# Patient Record
Sex: MALE | Race: BLACK OR AFRICAN AMERICAN
[De-identification: names, ages, dates, MRNs, and addresses within clinical notes are randomized per-mention and may not be internally consistent; named-entity substitution may affect disease eponyms.]

---

## 2020-05-08 ENCOUNTER — HOSPITAL ENCOUNTER (EMERGENCY)
Dept: HOSPITAL 61 - ER | Age: 35
Discharge: HOME | End: 2020-05-08
Payer: SELF-PAY

## 2020-05-08 VITALS — WEIGHT: 200.4 LBS | BODY MASS INDEX: 25.72 KG/M2 | HEIGHT: 74 IN

## 2020-05-08 VITALS — DIASTOLIC BLOOD PRESSURE: 81 MMHG | SYSTOLIC BLOOD PRESSURE: 141 MMHG

## 2020-05-08 DIAGNOSIS — G51.0: Primary | ICD-10-CM

## 2020-05-08 DIAGNOSIS — F17.200: ICD-10-CM

## 2020-05-08 DIAGNOSIS — J45.909: ICD-10-CM

## 2020-05-08 PROCEDURE — 99283 EMERGENCY DEPT VISIT LOW MDM: CPT

## 2020-05-08 NOTE — PHYS DOC
Past Medical History


Past Medical History:  Asthma


Past Surgical History:  No Surgical History


Smoking Status:  Current Every Day Smoker


Additional Information:  


0.5 PPD


Alcohol Use:  Occasionally





General Adult


EDM:


Chief Complaint:  FACE PROBLEM





HPI:


HPI:





Patient is a 34-year-old relatively healthy male who presents with a 2-day 

history of some right-sided facial weakness.  He states he is noticed that his 

smile is asymmetric he is having a difficult time closing his right eye.  Does 

have some pain associated with it but nothing significant.  He denies a headache

or any other lateralizing neurologic weakness.  He is never had Bell's palsy 

before.  []





Review of Systems:


Review of Systems:


Constitutional:  Denies fever or chills. []


Eyes:  Denies change in visual acuity. []


HENT:  Denies nasal congestion or sore throat. [] 


Respiratory:  Denies cough or shortness of breath. [] 


Cardiovascular:  Denies chest pain or edema. [] 


GI:  Denies abdominal pain, nausea, vomiting, bloody stools or diarrhea. [] 


:  Denies dysuria. [] 


Musculoskeletal:  Denies back pain or joint pain. [] 


Integument:  Denies rash. [] 


Neurologic: Per HPI [] 


Endocrine:  Denies polyuria or polydipsia. [] 


Lymphatic:  Denies swollen glands. [] 


Psychiatric:  Denies depression or anxiety. []





Heart Score:


Risk Factors:


Risk Factors:  DM, Current or recent (<one month) smoker, HTN, HLP, family 

history of CAD, obesity.


Risk Scores:


Score 0 - 3:  2.5% MACE over next 6 weeks - Discharge Home


Score 4 - 6:  20.3% MACE over next 6 weeks - Admit for Clinical Observation


Score 7 - 10:  72.7% MACE over next 6 weeks - Early Invasive Strategies





Allergies:


Allergies:





Allergies








Coded Allergies Type Severity Reaction Last Updated Verified


 


  No Known Drug Allergies    5/8/20 No











Physical Exam:


PE:





Constitutional: Well developed, well nourished, no acute distress, non-toxic 

appearance. []


HENT: Normocephalic, atraumatic, bilateral external ears normal, oropharynx 

moist, no oral exudates, nose normal. []


Eyes: PERRLA, EOMI, conjunctiva normal, no discharge. [] 


Neck: Normal range of motion, no tenderness, supple, no stridor. [] 


Cardiovascular:Heart rate regular rhythm, no murmur []


Lungs & Thorax:  Bilateral breath sounds clear to auscultation []


Abdomen: Bowel sounds normal, soft, no tenderness, no masses, no pulsatile 

masses. [] 


Skin: Warm, dry, no erythema, no rash. [] 


Back: No tenderness, no CVA tenderness. [] 


Extremities: No tenderness, no cyanosis, no clubbing, ROM intact, no edema. [] 


Neurologic: Right-sided facial weakness with forehead involvement consistent 

with Bell's palsy []


Psychologic: Anxious. []





Current Patient Data:


Vital Signs:





                                   Vital Signs








  Date Time  Temp Pulse Resp B/P (MAP) Pulse Ox O2 Delivery O2 Flow Rate FiO2


 


5/8/20 09:34 98.2 69 17 181/100 (127) 98 Room Air  





 98.2       











EKG:


EKG:


[]





Radiology/Procedures:


Radiology/Procedures:


[]





Course & Med Decision Making:


Course & Med Decision Making


Pertinent Labs and Imaging studies reviewed. (See chart for details)





[]





Dragon Disclaimer:


Dragon Disclaimer:


This electronic medical record was generated, in whole or in part, using a voice

 recognition dictation system.





Departure


Departure


Impression:  


   Primary Impression:  


   Bell's palsy


Disposition:  01 HOME, SELF-CARE


Condition:  STABLE


Referrals:  


NO PCP (PCP)


Patient Instructions:  Bell's Palsy





Additional Instructions:  


You need to follow-up with a primary care physician to keep a close eye on your 

blood pressure.  It is very important that you take all the medicines exactly as

 prescribed.  Return to the emergency department with any new or concerning 

symptoms


Scripts


Prednisone (PREDNISONE) 20 Mg Tablet


3 TAB PO DAILY PRN for COUGH for 7 Days, #21 TAB


   Prov: BEATRICE ROBERT DO         5/8/20 


Valacyclovir Hcl (VALTREX) 1,000 Mg Tablet


1 TAB PO TID, #21 TAB


   Prov: BEATRICE ROBERT DO         5/8/20











BEATRICE ROBERT DO              May 8, 2020 10:05

## 2020-07-11 ENCOUNTER — HOSPITAL ENCOUNTER (EMERGENCY)
Dept: HOSPITAL 61 - ER | Age: 35
End: 2020-07-11
Payer: SELF-PAY

## 2020-07-11 VITALS — SYSTOLIC BLOOD PRESSURE: 129 MMHG | DIASTOLIC BLOOD PRESSURE: 58 MMHG

## 2020-07-11 VITALS — WEIGHT: 212.08 LBS | HEIGHT: 74 IN | BODY MASS INDEX: 27.22 KG/M2
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
## 2020-07-11 DIAGNOSIS — S80.812A: Primary | ICD-10-CM
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
## 2020-07-11 DIAGNOSIS — J45.909: ICD-10-CM
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
## 2020-07-11 DIAGNOSIS — F17.200: ICD-10-CM
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
## 2020-07-11 PROCEDURE — 90715 TDAP VACCINE 7 YRS/> IM: CPT

## 2020-07-11 PROCEDURE — 90471 IMMUNIZATION ADMIN: CPT

## 2020-07-11 PROCEDURE — 96372 THER/PROPH/DIAG INJ SC/IM: CPT

## 2020-07-11 PROCEDURE — 99284 EMERGENCY DEPT VISIT MOD MDM: CPT

## 2020-07-11 NOTE — PHYS DOC
Past Medical History


Past Medical History:  Asthma


 (MUSHTAQ CARBAJAL)


Past Surgical History:  No Surgical History


 (MUSHTAQ CARBAJAL)


Smoking Status:  Current Every Day Smoker


Alcohol Use:  Occasionally


 (MUSHTAQ CARBAJAL)





General Adult


EDM:


Chief Complaint:  LOWER EXT PAIN





HPI:


HPI:





Patient is a 35  year old male who presents with complaints of painful abrasion 

to his left lower leg that he suffered 3 days ago.  Patient states that a piece 

of metal scraped down the back of his lower leg.  Initially it was only slightly

painful, but now his pain scale is about a 4/10.  Patient states that he has 

cleansed it with soap and water daily and has been placing peroxide on for wound

care.  Patient states his tetanus shot was greater than 5 years ago.  Patient 

states that he would like a work excuse and a prescription for pain medicines.  

Patient denies fever chills, any changes in visual acuity, denies nasal 

congestion or sore throat.  Patient denies any cough or shortness of breath.  

Patient denies chest pain or extremity edema.  Patient denies abdominal pain, 

nausea, vomiting, diarrhea, or bloody stools.  Patient denies any back pain or 

pain in his joints.  Patient denies any skin rashes.  Patient denies problems 

urinating, increased urination, increased thirst.  Patient denies any swollen 

glands.  Patient denies any headaches focal weaknesses or sensory changes since 

the incident.  Patient denies any depression, anxiety, or recent life changes.


 (MUSHTAQ CARBAJAL)





Review of Systems:


Review of Systems:


Constitutional:   Denies fever or chills. 


Eyes:   Denies change in visual acuity. 


HENT:   Denies nasal congestion or sore throat.  


Respiratory:   Denies cough or shortness of breath.  


Cardiovascular:   Denies chest pain or edema.  


GI:   Denies abdominal pain, nausea, vomiting, bloody stools or diarrhea.  


:  Denies dysuria.  


Musculoskeletal:   Denies back pain or joint pain.  


Integument:   Denies rash.  Complains of abrasion to his left lower leg


Neurologic:   Denies headache, focal weakness or sensory changes.  


Endocrine:   Denies polyuria or polydipsia.  


Lymphatic:  Denies swollen glands.  


Psychiatric:  Denies depression or anxiety. 


 (MUSHTAQ CARBAJAL)





Heart Score:


Risk Factors:


Risk Factors:  DM, Current or recent (<one month) smoker, HTN, HLP, family 

history of CAD, obesity.


Risk Scores:


Score 0 - 3:  2.5% MACE over next 6 weeks - Discharge Home


Score 4 - 6:  20.3% MACE over next 6 weeks - Admit for Clinical Observation


Score 7 - 10:  72.7% MACE over next 6 weeks - Early Invasive Strategies


 (MUSHTAQ CARBAJAL)





Family History:


Family History:


No family history significant to this ER visit today.


 (MUSHTAQ CARBAJAL)





Current Medications:


Patient denies taking home medications.


Current Medications








 Medications


  (Trade)  Dose


 Ordered  Sig/Rubia  Start Time


 Stop Time Status Last Admin


Dose Admin


 


 Bacitracin


  (Bacitracin Zinc


 Oint Pkt)  1 pkt  1X  ONCE  7/11/20 18:15


 7/11/20 18:16   





 


 Diphtheria/


 Tetanus/Acell


 Pertussis


  (ADACEL TDap


 SYRINGE)  0.5 ml  ONCE ONCE  7/11/20 18:15


 7/11/20 18:16   





 


 Ketorolac


 Tromethamine


  (Toradol Im)  60 mg  1X  ONCE  7/11/20 18:15


 7/11/20 18:16   











 (MUSHTAQ CARBAJAL)





Allergies:


Allergies:


Patient denies allergies to medications.


Allergies








Coded Allergies Type Severity Reaction Last Updated Verified


 


  No Known Drug Allergies    5/8/20 No








 (MUSHTAQ CARBAJAL)





Physical Exam:


PE:





Constitutional: Well developed, well nourished, no acute distress, non-toxic 

appearance. 


HENT: Normocephalic, atraumatic, bilateral external ears normal, oropharynx 

moist, no oral exudates, nose normal. 


Eyes: PERRLA, EOMI, conjunctiva normal, no discharge.  Pupils 5 mm.


Neck: Normal range of motion, no tenderness, supple, no stridor.  


Cardiovascular:Heart rate regular rhythm, no murmur heart sounds S1-S2, no 

abnormalities noted per auscultation.


Lungs & Thorax:  Bilateral breath sounds clear to auscultation all lung fields.


Abdomen: Bowel sounds normal all 4 quadrants to auscultation, soft, no 

tenderness, no masses, no pulsatile masses.  


Skin: Warm, dry, no erythema, no rash.   Has superficial abrasion to his left 

lower extremity from just below the knee down to the Achilles area posterior 

skin surface, without bleeding, consistent with abrasion from 3 days ago, no 

sign of infectious process.  No drainage.  Abrasion is slightly erythematous.


Back: No tenderness, no CVA tenderness.  


Extremities: Tenderness to left lower extremity, otherwise all other extremities

are nontender, no cyanosis, no clubbing, ROM intact, no edema.  


Neurologic: Alert and oriented X 3, normal motor function, normal sensory 

function, no focal deficits noted. 


Psychologic: Affect normal, judgement normal, mood normal. 


 (MUSHTAQ CARBAJAL)





Current Patient Data:


Vital Signs:





                                   Vital Signs








  Date Time  Temp Pulse Resp B/P (MAP) Pulse Ox O2 Delivery O2 Flow Rate FiO2


 


7/11/20 17:19 98.3 73 24  98   





 98.3       








 (MUSHTAQ CARBAJAL)





EKG:


EKG:


[]


 (MUSHTAQ CARBAJAL)





Radiology/Procedures:


Radiology/Procedures:


[]


 (MUSHTAQ CARBAJAL)





Course & Med Decision Making:


Course & Med Decision Making


Pertinent Labs and Imaging studies reviewed. (See chart for details)





35-year-old male patient presents to the emergency department with complaints of

 an abrasion to his left lower extremity that he suffered 3 days ago.  Patient 

states that a piece of metal scraped the back of his leg while he was outside 

working.  Patient states he has been cleaning with soap and water and peroxide. 

 Patient complains of increased pain over the past 3 days.  Patient is 

requesting pain medication and a work excuse.  Upon examination of patient's 

abrasion, patient has a noninfectious abrasion that that runs the length of his 

left lower extremity from just above the calf down to the Achilles tendon 

posterior aspect, nonbleeding, no purulent drainage noted, slightly 

erythematous.  Patient rates his pain at a 4/10.  Plan is to update patient's 

tetanus status as it is been longer than 5 years since his last tetanus 

immunization.  We will also cleanse wound and dressed with bacitracin ointment. 

 We will give 60 mg Toradol IM injection for pain.  Will give work excuse.  

Discussed discharge instructions and wound care instructions with patient.  

Patient is agreeable to home care instructions and DC plan.  Patient will be 

given a prescription of 800 mg Motrin for pain.  Patient gave verbal 

understanding of home instructions, has no further questions or concerns.


 (MUSHTAQ CARBAJAL)





Dragon Disclaimer:


Dragon Disclaimer:


This electronic medical record was generated, in whole or in part, using a voice

 recognition dictation system.


 (MUSHTAQ CARBAJAL)





Departure


Departure


Impression:  


   Primary Impression:  


   Abrasion of left leg


Disposition:  01 HOME, SELF-CARE


Condition:  GOOD


Referrals:  


NO PCP (PCP)


Patient Instructions:  Wound Care, Easy-to-Read





Additional Instructions:  


Take prescribed medications as directed.  Follow wound care instructions.  

Return to the emergency department for further concerns.  Follow-up with your 

doctor if symptoms persist.


Scripts


Bacitracin/Polymyxin B Sulfate (POLYSPORIN TOPICAL OINT) 28.3 Gm Oint...g.


1 OLGA TP TID for WOUND CARE, #1 TUBE 0 Refills


   AS DIRECTED BY PHYSICIAN


   Prov: MUSHTAQ CARBAJAL         7/11/20 


Ibuprofen (IBUPROFEN) 800 Mg Tablet


800 MG PO PRN Q6HRS PRN for INFLAMMATION, #15 TAB


   Prov: MUSHTAQ CARBAJAL         7/11/20





Justicifation of Admission Dx:


Justifications for Admission:


Justification of Admission Dx:  N/A


 (MUSHTAQ CARBAJAL)





Attending Signature


Attending Signature


I have reviewed the PA/NP's note and plan of care. I was available for 

consultation as needed during the patient's visit in the emergency department. I

 agree with the clinical impression, plan, and disposition.


 (MUSHTAQ REIS DO)











MUSHTAQ CARBAJAL         Jul 11, 2020 17:58


MUSHTAQ REIS DO             Jul 12, 2020 06:38

## 2022-02-09 ENCOUNTER — HOSPITAL ENCOUNTER (EMERGENCY)
Dept: HOSPITAL 61 - ER | Age: 37
Discharge: HOME | End: 2022-02-09
Payer: SELF-PAY

## 2022-02-09 VITALS — DIASTOLIC BLOOD PRESSURE: 72 MMHG | SYSTOLIC BLOOD PRESSURE: 134 MMHG

## 2022-02-09 VITALS — WEIGHT: 220.46 LBS | HEIGHT: 74 IN | BODY MASS INDEX: 28.29 KG/M2

## 2022-02-09 DIAGNOSIS — J45.901: Primary | ICD-10-CM

## 2022-02-09 DIAGNOSIS — F17.200: ICD-10-CM

## 2022-02-09 PROCEDURE — 71045 X-RAY EXAM CHEST 1 VIEW: CPT

## 2022-02-09 PROCEDURE — 94640 AIRWAY INHALATION TREATMENT: CPT

## 2022-02-09 PROCEDURE — 99283 EMERGENCY DEPT VISIT LOW MDM: CPT

## 2022-02-09 NOTE — PHYS DOC
Past Medical History


Past Medical History:  Asthma


Past Surgical History:  No Surgical History


Smoking Status:  Current Every Day Smoker


Alcohol Use:  Occasionally





General Adult


EDM:


Chief Complaint:  SHORTNESS OF BREATH





HPI:


HPI:





Patient is a 36  year old male who present to ER for evaluation of trouble 

breathing and cough for 4 days.  Patient has history of asthma, he continues to 

smoke.  Patient denies any chest pain, no cough fever.  Patient said he was vac

cinated for COVID-19.





Review of Systems:


Review of Systems:


Constitutional:   Denies fever or chills. []


Eyes:   Denies change in visual acuity. []


HENT:   Denies nasal congestion or sore throat. [] 


Respiratory: Positive for cough and trouble breathing.


Cardiovascular:   Denies chest pain or edema. [] 


GI:   Denies abdominal pain, nausea, vomiting, bloody stools or diarrhea. [] 


:  Denies dysuria. [] 


Musculoskeletal:   Denies back pain or joint pain. [] 


Integument:   Denies rash. [] 


Neurologic:   Denies headache, focal weakness or sensory changes. [] 


Endocrine:   Denies polyuria or polydipsia. [] 


Lymphatic:  Denies swollen glands. [] 


Psychiatric:  Denies depression or anxiety. []





Heart Score:


C/O Chest Pain:  N/A


Risk Factors:


Risk Factors:  DM, Current or recent (<one month) smoker, HTN, HLP, family 

history of CAD, obesity.


Risk Scores:


Score 0 - 3:  2.5% MACE over next 6 weeks - Discharge Home


Score 4 - 6:  20.3% MACE over next 6 weeks - Admit for Clinical Observation


Score 7 - 10:  72.7% MACE over next 6 weeks - Early Invasive Strategies





Current Medications:





Current Medications








 Medications


  (Trade)  Dose


 Ordered  Sig/Rubia  Start Time


 Stop Time Status Last Admin


Dose Admin


 


 Albuterol/


 Ipratropium


  (Duoneb)  3 ml  1X  ONCE  22 09:45


 22 09:46 DC  





 


 Hydromorphone HCl


  (Dilaudid)  2 mg  1X  ONCE  22 09:45


 22 09:46 Cancel  





 


 Prednisone


  (Prednisone)  60 mg  1X  ONCE  22 09:45


 22 09:46 DC  














Allergies:


Allergies:





Allergies








Coded Allergies Type Severity Reaction Last Updated Verified


 


  No Known Drug Allergies    5/8/20 No











Physical Exam:


PE:





Constitutional: Well developed, well nourished, no acute distress, non-toxic 

appearance. []


HENT: Normocephalic, atraumatic, bilateral external ears normal, oropharynx 

moist, no oral exudates, nose normal. []


Eyes: PERRLA, EOMI, conjunctiva normal, no discharge. [] 


Neck: Normal range of motion, no tenderness, supple, no stridor. [] 


Cardiovascular:Heart rate regular rhythm, no murmur []


Lungs & Thorax:  Bilateral breath sounds with expiratory wheezing to 

auscultation


Abdomen: Bowel sounds normal, soft, no tenderness, no masses, no pulsatile 

masses. [] 


Skin: Warm, dry, no erythema, no rash. [] 


Back: No tenderness, no CVA tenderness. [] 


Extremities: No tenderness, no cyanosis, no clubbing, ROM intact, no edema. [] 


Neurologic: Alert and oriented X 3, normal motor function, normal sensory 

function, no focal deficits noted. []


Psychologic: Affect normal, judgement normal, mood normal. []





Current Patient Data:


Labs:





Current Medications








 Medications


  (Trade)  Dose


 Ordered  Sig/Rubia


 Route


 PRN Reason  Start Time


 Stop Time Status Last Admin


Dose Admin


 


 Hydromorphone HCl


  (Dilaudid)  2 mg  1X  ONCE


 IVP


   22 09:45


 22 09:46 Cancel  





 


 Albuterol/


 Ipratropium


  (Duoneb)  3 ml  1X  ONCE


 NEB


   22 09:45


 22 09:46 DC 22 10:05





 


 Prednisone


  (Prednisone)  60 mg  1X  ONCE


 PO


   22 09:45


 22 09:46 DC 22 10:01











Vital Signs:





                                   Vital Signs








  Date Time  Temp Pulse Resp B/P (MAP) Pulse Ox O2 Delivery O2 Flow Rate FiO2


 


22 09:23 98.3 82 20 140/64 (89) 100 Room Air  





 98.3       











EKG:


EKG:


[]





Radiology/Procedures:


Radiology/Procedures:


[][facility]


                    [address] [place] [address]


                                 [phone]


                                        


                                 IMAGING REPORT





                                     Signed





PATIENT: MUSHTAQ VARGAS ACCOUNT: [id number]     MRN#: [id number]


: 1985           LOCATION: ER              AGE: 36


SEX: M                    EXAM DT: 22         ACCESSION#: [id number]


STATUS: REG ER            ORD. PHYSICIAN: DAVID QUEZADA DO


REASON: SHORTNESS OF BREATH, ASTHMA, SMOKER


PROCEDURE: CHEST AP ONLY





XR CHEST 1V





History: Reason: SHORTNESS OF BREATH, ASTHMA, SMOKER / Spl. Instructions:  / 

History: 





Comparison: None.





Findings:


No consolidation or pleural effusion. Normal heart size. No pneumothorax.





Impression: 


1.  No acute cardiopulmonary process.





Electronically signed by: Arsalan Webster DO (2022 9:57 AM) [id number]














DICTATED and SIGNED BY:     ARSALAN WEBSTER DO


DATE:     22 [id number] 0





Course & Med Decision Making:


Course & Med Decision Making


Pertinent Labs and Imaging studies reviewed. (See chart for details)





Patient is a 36-year-old male who present to ER due to trouble breathing for 4 

days.  Patient has history of asthma, he continues to smoke.  Patient was given 

treatment in ER, he feel much better.  Patient will be discharged home.





Dragon Disclaimer:


Dragon Disclaimer:


This electronic medical record was generated, in whole or in part, using a voice

 recognition dictation system.





Departure


Departure


Impression:  


   Primary Impression:  


   Asthma exacerbation


Disposition:  01 HOME / SELF CARE / HOMELESS


Condition:  IMPROVED


Referrals:  


NO PCP (PCP)


Follow up with your PCP as needed


Patient Instructions:  Asthma, Adult





Additional Instructions:  


Thank you for visiting our Emergency Department. We appreciate you trusting us 

with your care. If any additional problems come up don't hesitate to return to 

visit us. Please follow up with your primary care provider so they can plan 

additional care if needed and know about the problem that you had. If symptoms 

worsen come back to the Emergency Department. Any concerning symptoms that start

 such as chest pain, shortness of air, weakness or numbness on one side of the 

body, running high fevers or any other concerning symptoms return to the ER.


Scripts


Albuterol Sulfate (PROAIR HFA INHALER) 8.5 Gm Hfa.aer.ad


2 PUFF IH PRN Q4-6HRS PRN for wheezing for 21 Days, #1 INHALER 0 Refills


   Prov: DAVID QUEZADA DO         22 


Prednisone (PREDNISONE) 20 Mg Tablet


1 TAB PO DAILY for 7 Days, #7 TAB


   Prov: DAVID QUEZADA DO         22











DAVID QUEZADA DO                 2022 09:53

## 2022-02-09 NOTE — RAD
XR CHEST 1V



History: Reason: SHORTNESS OF BREATH, ASTHMA, SMOKER / Spl. Instructions:  / History: 



Comparison: None.



Findings:

No consolidation or pleural effusion. Normal heart size. No pneumothorax.



Impression: 

1.  No acute cardiopulmonary process.



Electronically signed by: Arsalan López DO (2/9/2022 9:57 AM) [id number]

## 2022-05-12 ENCOUNTER — HOSPITAL ENCOUNTER (EMERGENCY)
Dept: HOSPITAL 61 - ER | Age: 37
Discharge: HOME | End: 2022-05-12
Payer: SELF-PAY

## 2022-05-12 VITALS — SYSTOLIC BLOOD PRESSURE: 133 MMHG | DIASTOLIC BLOOD PRESSURE: 75 MMHG

## 2022-05-12 VITALS — BODY MASS INDEX: 29.43 KG/M2 | WEIGHT: 229.28 LBS | HEIGHT: 74 IN
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
## 2022-05-12 DIAGNOSIS — F17.200: ICD-10-CM
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
## 2022-05-12 DIAGNOSIS — S81.812A: Primary | ICD-10-CM

## 2022-05-12 DIAGNOSIS — J45.909: ICD-10-CM

## 2022-05-12 PROCEDURE — 99282 EMERGENCY DEPT VISIT SF MDM: CPT

## 2022-05-12 PROCEDURE — 12002 RPR S/N/AX/GEN/TRNK2.6-7.5CM: CPT

## 2022-05-12 NOTE — PHYS DOC
Past Medical History


Past Medical History:  Asthma


Past Surgical History:  No Surgical History


Smoking Status:  Current Every Day Smoker


Alcohol Use:  Occasionally





General Adult


EDM:


Chief Complaint:  LACERATION/AVULSION





HPI:


HPI:





Patient is a 36 year old male who presents with laceration to the lateral aspect

of his left lower leg.  Patient sustained an injury just a couple hours prior to

arrival in the emergency department.  He states he was "messing with the AC 

unit," when he sustained a laceration.  The wound was immediately irrigated with

tap water and cleansed with hydrogen peroxide.  Hemostasis was achieved prior to

examination.  Patient reports his last tetanus vaccination was within the past 5

years.





Review of Systems:


Review of Systems:


ROS negative or noncontributory except as mentioned in HPI.





Heart Score:


C/O Chest Pain:  No





Current Medications:





Current Medications








 Medications


  (Trade)  Dose


 Ordered  Sig/Rubia  Start Time


 Stop Time Status Last Admin


Dose Admin


 


 Lidocaine/


 Epinephrine


  (LIDOCAINE


 1%-EPI 1:100,000


 Multi-Dose)  20 ml  1X  ONCE  5/12/22 13:15


 5/12/22 13:16 DC 5/12/22 13:20


20 ML











Allergies:


Allergies:





Allergies








Coded Allergies Type Severity Reaction Last Updated Verified


 


  No Known Drug Allergies    5/8/20 No











Physical Exam:


PE:





Constitutional: Well developed, well nourished, no acute distress, non-toxic 

appearance. 


HENT: Normocephalic, atraumatic, bilateral external ears normal, nose normal. 


Eyes: EOMI, conjunctiva normal, no discharge.  


Neck: Normal range of motion, no stridor.  


Skin: ~3.5 cm linear laceration open >1cm noted to lateral aspect of left lower 

leg without active bleeding or foreign body.Warm, dry, no erythema, no rash. 


Extremities: No tenderness, no cyanosis, no clubbing, ROM intact, no edema. 


Neurologic: Alert and oriented x4, normal motor function, normal sensory 

function, no focal deficits noted.





Current Patient Data:


Vital Signs:





                                   Vital Signs








  Date Time  Temp Pulse Resp B/P (MAP) Pulse Ox O2 Delivery O2 Flow Rate FiO2


 


5/12/22 12:40 98.1 82 16 133/75 (94) 97 Room Air  





 98.1       











Course & Med Decision Making:


Course & Med Decision Making


Pertinent Labs and Imaging studies reviewed. (See chart for details)





Dragon Disclaimer:


Dragon Disclaimer:


This electronic medical record was generated, in whole or in part, using a voice

recognition dictation system.





Laceration Repair


Lac Repair


Indication: Lower leg laceration





Procedure: The patient was placed in the appropriate position and anesthesia 

around the laceration was proximately 3 cc 1% lidocaine with epinephrine. The 

area was then cleansed with povidone/iodine scrub and thoroughly irrigated with 

sterile saline. The laceration was closed with 7 simple interrupted 4-0 nylon 

sutures.  The wound area was then dressed with nonadhesive gauze dressing.  





Total repaired wound length: Proximately 3.5cm. 





Other Items: The patient tolerated the procedure well.





Complications: No complications.





Departure


Departure


Impression:  


   Primary Impression:  


   Laceration without foreign body, left lower leg, initial encounter


Disposition:  01 HOME / SELF CARE / HOMELESS


Condition:  IMPROVED


Referrals:  


NO PCP (PCP)


Patient Instructions:  Sutured Wound Care, Easy-to-Read





Additional Instructions:  


EMERGENCY DEPARTMENT GENERAL DISCHARGE INSTRUCTIONS





Thank you for coming to [facility] Emergency Department (ED) [facility] and trusting us with you care.  We trust that you had a positive experience in

our Emergency Department.  If you wish to speak to the department management, 

you may call the director at [phone].





YOUR FOLLOW UP INSTRUCTIONS ARE AS FOLLOWS:


1.  Follow up with your primary care doctor. If you do not have a primary 

doctor, please ask for a resource list of physicians or clinics that may be able

to assist you with follow up care.


2.  The emergency provider has interpreted your imaging studies, if any were 

ordered.  The radiology imaging specialist also reviewed them.  If there is a 

change in the findings, you will be notified in 48 hours when at all possible.


3.  If a lab test or culture has been done, your results will be reviewed and 

you will be notified if you need a change in treatment.


4.  Follow instructions verbalized to you and refer to the printouts if needed.





ADDITIONAL INSTRUCTIONS AND INFORMATION:


1.  Your care today has been supervised by a physician who is specially trained 

in emergency care.  Many problems require more than one evaluation for a 

complete diagnosis and treatment.  We recommend that you schedule your follow up

appointment as recommended to ensure complete treatment of you illness or 

injury.  If you are unable to obtain follow up care and continue to have a 

problem, or if your condition worsens, we recommend that you return to the ED.


2.  We are not able to safely determine your condition over the phone nor are we

able to give sound medical advice over the phone.  For these safety reasons, if 

you call for medical advice we will ask you to come to the ED for further 

evaluation.


3.  If you have any questions regarding these discharge instructions please call

the ED at [phone].





SAFETY INFORMATION:


In the interest of safety, wellness, and injury prevention; we encourage you to 

wear your seat belt, if you smoke; quite smoking, and we encourage family to use

a protective helmet for bicycling and other sporting events that present an 

increased risk for head injury.





IF YOUR SYMPTOMS WORSEN OR NEW SYMPTOMS DEVELOP, OR YOU HAVE CONCERNS ABOUT YOUR

CONDITION; OR IF YOUR CONDITION WORSENS WHILE YOU ARE WAITING FOR YOUR FOLLOW UP

APPOINTMENT; EITHER CONTACT YOUR PRIMARY CARE DOCTOR, THE PHYSICIAN WHOSE NAME 

AND NUMBER YOU WERE GIVEN, OR RETURN TO THE ED IMMEDIATELY.











JAMIA JAQUEZ              May 12, 2022 13:42

## 2022-05-19 ENCOUNTER — HOSPITAL ENCOUNTER (EMERGENCY)
Dept: HOSPITAL 61 - ER | Age: 37
Discharge: HOME | End: 2022-05-19
Payer: SELF-PAY

## 2022-05-19 VITALS — SYSTOLIC BLOOD PRESSURE: 216 MMHG | DIASTOLIC BLOOD PRESSURE: 111 MMHG

## 2022-05-19 VITALS — HEIGHT: 74 IN | BODY MASS INDEX: 28.86 KG/M2 | WEIGHT: 224.87 LBS

## 2022-05-19 DIAGNOSIS — F17.200: ICD-10-CM

## 2022-05-19 DIAGNOSIS — J45.909: ICD-10-CM

## 2022-05-19 DIAGNOSIS — S81.812D: Primary | ICD-10-CM

## 2022-05-19 DIAGNOSIS — R03.0: ICD-10-CM
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
## 2022-05-19 PROCEDURE — 99283 EMERGENCY DEPT VISIT LOW MDM: CPT

## 2022-05-19 NOTE — PHYS DOC
Past Medical History


Past Medical History:  Asthma


Past Surgical History:  No Surgical History


Smoking Status:  Current Every Day Smoker


Alcohol Use:  Occasionally


Drug Use:  None





General Adult


EDM:


Chief Complaint:  LOWER EXTREMITY SWELLING





HPI:


HPI:





Patient is a 36  year old male who presents with concern about wound healing 

from sutures that were placed 7 days ago.  Patient reports pain for the past 2 

days and redness.  He denies any warmth around the wound, discharge from the 

wound, fever, chills, generalized weakness.  Patient has no other complaints at 

this time.





Review of Systems:


Review of Systems:


ROS negative or noncontributory except as mentioned in HPI.





Heart Score:


C/O Chest Pain:  No





Allergies:


Allergies:





Allergies








Coded Allergies Type Severity Reaction Last Updated Verified


 


  No Known Drug Allergies    5/8/20 No











Physical Exam:


PE:





Constitutional: Well developed, well nourished, no acute distress, non-toxic 

appearance. 


HENT: Normocephalic, atraumatic, bilateral external ears normal, nose normal. 


Eyes: EOMI, conjunctiva normal, no discharge.  


Neck: Normal range of motion, no stridor.  


Skin: Well approximated approximately 4 cm wound noted to the lateral aspect of 

the distal left lower extremity, some surrounding erythematous pigment change 

without warmth, drainage or dehiscence.  Skin otherwise warm, dry, no erythema, 

no rash.  


Extremities: Mild tenderness surrounding sutured wound, no cyanosis, no 

clubbing, ROM intact, no edema, neurovascular intact.


Neurologic: Alert and oriented x4, normal motor function, normal sensory 

function, no focal deficits noted.





Current Patient Data:


Vital Signs:





                                   Vital Signs








  Date Time  Temp Pulse Resp B/P (MAP) Pulse Ox O2 Delivery O2 Flow Rate FiO2


 


5/19/22 11:44 98.7 94 18 216/111 (146) 96 Room Air  





 98.7       











Course & Med Decision Making:


Course & Med Decision Making


Pertinent Labs and Imaging studies reviewed. (See chart for details)





Patient is a 36-year-old male who presents for wound check.  Sutures were placed

by me 7 days ago.  Wound appears to be healing well, though does have some 

redness and swelling.  There is no warmth or drainage from the wound.  Patient 

will be started on course of p.o. antibiotics and instructed to return or go to 

primary care) in 3 days for suture removal.  I did have another conversation 

with the patient about his elevated blood pressure and his need to be evaluated 

by primary care, as he likely needs pharmacologic intervention.  Return 

precautions provided.  Patient understands and is agreeable to discharge plan.





Dragon Disclaimer:


Dragon Disclaimer:


This electronic medical record was generated, in whole or in part, using a voice

recognition dictation system.





Departure


Departure


Impression:  


   Primary Impression:  


   Visit for wound check


   Additional Impression:  


   Elevated blood pressure reading


Disposition:  01 HOME / SELF CARE / HOMELESS


Condition:  STABLE


Referrals:  


NO PCP (PCP)


Patient Instructions:  Sutured Wound Care, Easy-to-Read, Wound Care, 

Easy-to-Read





Additional Instructions:  


EMERGENCY DEPARTMENT GENERAL DISCHARGE INSTRUCTIONS





Thank you for coming to [facility] Emergency Department (ED) 

today and trusting us with you care.  We trust that you had a positive 

experience in our Emergency Department.  If you wish to speak to the department 

management, you may call the director at [phone].





YOUR FOLLOW UP INSTRUCTIONS ARE AS FOLLOWS:


1.  Follow up with your primary care doctor. If you do not have a primary 

doctor, please ask for a resource list of physicians or clinics that may be able

to assist you with follow up care.


2.  The emergency provider has interpreted your imaging studies, if any were 

ordered.  The radiology imaging specialist also reviewed them.  If there is a 

change in the findings, you will be notified in 48 hours when at all possible.


3.  If a lab test or culture has been done, your results will be reviewed and 

you will be notified if you need a change in treatment.


4.  Follow instructions verbalized to you and refer to the printouts if needed.





ADDITIONAL INSTRUCTIONS AND INFORMATION:


1.  Your care today has been supervised by a physician who is specially trained 

in emergency care.  Many problems require more than one evaluation for a 

complete diagnosis and treatment.  We recommend that you schedule your follow up

appointment as recommended to ensure complete treatment of you illness or 

injury.  If you are unable to obtain follow up care and continue to have a 

problem, or if your condition worsens, we recommend that you return to the ED.


2.  We are not able to safely determine your condition over the phone nor are we

able to give sound medical advice over the phone.  For these safety reasons, if 

you call for medical advice we will ask you to come to the ED for further 

evaluation.


3.  If you have any questions regarding these discharge instructions please call

the ED at [phone].





SAFETY INFORMATION:


In the interest of safety, wellness, and injury prevention; we encourage you to 

wear your seat belt, if you smoke; quite smoking, and we encourage family to use

a protective helmet for bicycling and other sporting events that present an 

increased risk for head injury.





IF YOUR SYMPTOMS WORSEN OR NEW SYMPTOMS DEVELOP, OR YOU HAVE CONCERNS ABOUT YOUR

CONDITION; OR IF YOUR CONDITION WORSENS WHILE YOU ARE WAITING FOR YOUR FOLLOW UP

APPOINTMENT; EITHER CONTACT YOUR PRIMARY CARE DOCTOR, THE PHYSICIAN WHOSE NAME 

AND NUMBER YOU WERE GIVEN, OR RETURN TO THE ED IMMEDIATELY.


Scripts


Sulfamethoxazole/Trimethoprim (BACTRIM DS TABLET) 1 Each Tablet


1 TAB PO BID for 5 Days, #9 TAB 0 Refills


   Prov: JAMIA JAQUEZ         5/19/22











JAMIA JAQUEZ            May 19, 2022 12:07

## 2022-05-25 ENCOUNTER — HOSPITAL ENCOUNTER (EMERGENCY)
Dept: HOSPITAL 61 - ER | Age: 37
Discharge: HOME | End: 2022-05-25
Payer: SELF-PAY

## 2022-05-25 VITALS — BODY MASS INDEX: 29.06 KG/M2 | WEIGHT: 226.41 LBS | HEIGHT: 74 IN

## 2022-05-25 VITALS — SYSTOLIC BLOOD PRESSURE: 132 MMHG | DIASTOLIC BLOOD PRESSURE: 72 MMHG

## 2022-05-25 DIAGNOSIS — J45.909: ICD-10-CM

## 2022-05-25 DIAGNOSIS — F17.200: ICD-10-CM
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
## 2022-05-25 DIAGNOSIS — S81.812D: Primary | ICD-10-CM

## 2022-05-25 PROCEDURE — 99282 EMERGENCY DEPT VISIT SF MDM: CPT

## 2022-05-25 NOTE — ED.ADGEN
Past Medical History


Past Medical History:  Asthma


Past Surgical History:  No Surgical History


Smoking Status:  Current Every Day Smoker


Alcohol Use:  Occasionally


Drug Use:  None





General Adult


EDM:


Chief Complaint:  SUTURE/STAPLE REMOVAL





HPI:


HPI:





Patient is a 37-year-old male who arrives ambulatory to the emergency department

asking for suture removal.  Patient sustained a laceration to his left lower 

extremity 11 days ago.  Patient states he has been healing well without 

complication.  Patient is simply asking for his stitches to be removed.  He is 

otherwise nontoxic-appearing without medical complaint.





Review of Systems:


Review of Systems:


Constitutional:   Denies fever or chills. []


Eyes:   Denies change in visual acuity. []


HENT:   Denies nasal congestion or sore throat. [] 


Respiratory:   Denies cough or shortness of breath. [] 


Cardiovascular:   Denies chest pain or edema. [] 


GI:   Denies abdominal pain, nausea, vomiting, bloody stools or diarrhea. [] 


:  Denies dysuria. [] 


Musculoskeletal:   Denies back pain or joint pain. [] 


Integument:   Healing laceration.  Denies rash. [] 


Neurologic:   Denies headache, focal weakness or sensory changes. [] 


Endocrine:   Denies polyuria or polydipsia. [] 


Lymphatic:  Denies swollen glands. [] 


Psychiatric:  Denies depression or anxiety. []





Allergies:


Allergies:





Allergies








Coded Allergies Type Severity Reaction Last Updated Verified


 


  No Known Drug Allergies    5/25/22 No











Physical Exam:


PE:





Constitutional: Well developed, well nourished, no acute distress, non-toxic 

appearance. []


HENT: Normocephalic, atraumatic, bilateral external ears normal, oropharynx 

moist, no oral exudates, nose normal. []


Eyes: PERRLA, EOMI, conjunctiva normal, no discharge. [] 


Neck: Normal range of motion, no tenderness, supple, no stridor. [] 


Cardiovascular:Heart rate regular rhythm, no murmur []


Lungs & Thorax:  Bilateral breath sounds clear to auscultation []


Abdomen: Bowel sounds normal, soft, no tenderness, no masses, no pulsatile 

masses. [] 


Skin: Patient has a well-healing laceration of the lateral aspect of his left 

leg.  There is no surrounding erythema, drainage or tenderness upon palpation.  

Warm, dry, no erythema, no rash. [] 


Back: No tenderness, no CVA tenderness. [] 


Extremities: No tenderness, no cyanosis, no clubbing, ROM intact, no edema. [] 


Neurologic: Alert and oriented X 3, normal motor function, normal sensory 

function, no focal deficits noted. []


Psychologic: Affect normal, judgement normal, mood normal. []





Current Patient Data:


Vital Signs:





                                   Vital Signs








  Date Time  Temp Pulse Resp B/P (MAP) Pulse Ox O2 Delivery O2 Flow Rate FiO2


 


5/25/22 09:04 98.5 85 18 191/81 (117) 95 Room Air  





 98.5       











EKG:


EKG:


[]





Heart Score:


C/O Chest Pain:  No


Risk Factors:


Risk Factors:  DM, Current or recent (<one month) smoker, HTN, HLP, family 

history of CAD, obesity.


Risk Scores:


Score 0 - 3:  2.5% MACE over next 6 weeks - Discharge Home


Score 4 - 6:  20.3% MACE over next 6 weeks - Admit for Clinical Observation


Score 7 - 10:  72.7% MACE over next 6 weeks - Early Invasive Strategies





Radiology/Procedures:


Radiology/Procedures:


[]





Course & Med Decision Making:


Course & Med Decision Making


Pertinent Labs and Imaging studies reviewed. (See chart for details)





The patient was taken to fast-track room a where he was interviewed and 

evaluated.  Patient does have a laceration which is well-healing and intact.  

There are 6 sutures present.  Following physical examination, the sutures were 

removed completely without difficulty.  The patient tolerated the procedure 

well.  He has been advised to use vitamin E oil on the laceration over the next 

6 months to promote increased healing and minimize scarring.  He is stable for 

discharge.





[]





Dragon Disclaimer:


Dragon Disclaimer:


This electronic medical record was generated, in whole or in part, using a voice

 recognition dictation system.





Departure


Departure


Impression:  


   Primary Impression:  


   Visit for suture removal


Disposition:  01 HOME / SELF CARE / HOMELESS


Condition:  STABLE


Referrals:  


NO PCP (PCP)


Patient Instructions:  Suture Removal











LIVE HERNANDEZ DO               May 25, 2022 09:11